# Patient Record
Sex: MALE | Race: WHITE | Employment: FULL TIME | URBAN - METROPOLITAN AREA
[De-identification: names, ages, dates, MRNs, and addresses within clinical notes are randomized per-mention and may not be internally consistent; named-entity substitution may affect disease eponyms.]

---

## 2017-06-07 ENCOUNTER — LAB ENCOUNTER (OUTPATIENT)
Dept: LAB | Age: 37
End: 2017-06-07
Attending: FAMILY MEDICINE
Payer: COMMERCIAL

## 2017-06-07 ENCOUNTER — OFFICE VISIT (OUTPATIENT)
Dept: FAMILY MEDICINE CLINIC | Facility: CLINIC | Age: 37
End: 2017-06-07

## 2017-06-07 ENCOUNTER — HOSPITAL ENCOUNTER (OUTPATIENT)
Dept: GENERAL RADIOLOGY | Facility: HOSPITAL | Age: 37
Discharge: HOME | End: 2017-06-07
Attending: FAMILY MEDICINE
Payer: COMMERCIAL

## 2017-06-07 VITALS
DIASTOLIC BLOOD PRESSURE: 69 MMHG | HEART RATE: 66 BPM | WEIGHT: 203 LBS | SYSTOLIC BLOOD PRESSURE: 106 MMHG | TEMPERATURE: 98 F | BODY MASS INDEX: 30.07 KG/M2 | HEIGHT: 69 IN

## 2017-06-07 DIAGNOSIS — Z83.3 FH: DIABETES MELLITUS: ICD-10-CM

## 2017-06-07 DIAGNOSIS — Z00.00 WELL ADULT EXAM: ICD-10-CM

## 2017-06-07 DIAGNOSIS — Z00.00 WELL ADULT EXAM: Primary | ICD-10-CM

## 2017-06-07 DIAGNOSIS — R22.32 MASS OF FINGER OF LEFT HAND: ICD-10-CM

## 2017-06-07 DIAGNOSIS — E66.3 OVERWEIGHT (BMI 25.0-29.9): ICD-10-CM

## 2017-06-07 PROCEDURE — 36415 COLL VENOUS BLD VENIPUNCTURE: CPT

## 2017-06-07 PROCEDURE — 99395 PREV VISIT EST AGE 18-39: CPT | Performed by: FAMILY MEDICINE

## 2017-06-07 PROCEDURE — 83036 HEMOGLOBIN GLYCOSYLATED A1C: CPT

## 2017-06-07 PROCEDURE — 84443 ASSAY THYROID STIM HORMONE: CPT

## 2017-06-07 PROCEDURE — 85025 COMPLETE CBC W/AUTO DIFF WBC: CPT

## 2017-06-07 PROCEDURE — 80048 BASIC METABOLIC PNL TOTAL CA: CPT

## 2017-06-07 PROCEDURE — 80061 LIPID PANEL: CPT

## 2017-06-07 PROCEDURE — 84460 ALANINE AMINO (ALT) (SGPT): CPT

## 2017-06-07 PROCEDURE — 73120 X-RAY EXAM OF HAND: CPT | Performed by: FAMILY MEDICINE

## 2017-06-07 PROCEDURE — 84450 TRANSFERASE (AST) (SGOT): CPT

## 2017-06-07 NOTE — PROGRESS NOTES
Hansel Miller is a 43year old male who presents for a complete physical exam.   HPI:   Pt complains of Patient presents with:   Annual: Annual physical  Finger Pain: Pt c/o LT middle digit pain      Wt Readings from Last 6 Encounters:  06/07/17 : 203 lb atraumatic, normocephalic,ears and throat are clear  EYES:PERRLA, EOMI, conjunctiva are clear, extraocular movements intact  NECK: supple,no adenopathy,no bruits  CHEST: no chest tenderness  BREAST: no dominant or suspicious mass  LUNGS: clear to auscultat

## 2017-06-12 ENCOUNTER — TELEPHONE (OUTPATIENT)
Dept: FAMILY MEDICINE CLINIC | Facility: CLINIC | Age: 37
End: 2017-06-12

## 2017-06-12 NOTE — TELEPHONE ENCOUNTER
Patient called in and would like to have his results relayed to him over the phone. Please call him to discuss results. Please advise.

## 2017-06-19 ENCOUNTER — OFFICE VISIT (OUTPATIENT)
Dept: SURGERY | Facility: CLINIC | Age: 37
End: 2017-06-19

## 2017-06-19 DIAGNOSIS — M12.242 PIGMENTED VILLONODULAR SYNOVITIS OF LEFT HAND: Primary | ICD-10-CM

## 2017-06-19 PROCEDURE — 99212 OFFICE O/P EST SF 10 MIN: CPT | Performed by: PLASTIC SURGERY

## 2017-06-19 PROCEDURE — 99243 OFF/OP CNSLTJ NEW/EST LOW 30: CPT | Performed by: PLASTIC SURGERY

## 2017-06-19 RX ORDER — HYDROCODONE BITARTRATE AND ACETAMINOPHEN 7.5; 325 MG/1; MG/1
TABLET ORAL
Qty: 25 TABLET | Refills: 0 | Status: SHIPPED | OUTPATIENT
Start: 2017-06-19

## 2017-06-19 NOTE — PROGRESS NOTES
Pt request for surgery signed by pt and witnessed and signed by RN. Prescription for Norco and narcotic hand-out instruction sheet given to and reviewed w/patient.   Pre-Surgical Instruction Handout, Hand Elevation Handout, Dressing Protector Handout, and

## 2017-06-19 NOTE — H&P
Allison Andre is a 42year old male that presents with Patient presents with:  Ganglion: LMF  .     REFERRED BY:  Tammy Lopez    Pacemaker: No  Latex Allergy: no  Coumadin: No  Plavix: No  Other anticoagulants: No  Cardiac stents: No    HAND DOMINANCE: History   Problem Relation Age of Onset   • Diabetes Father    • Hypertension Father    • Lipids Father    • Diabetes Mother    • Hypertension Mother    • Lipids Mother    • Cancer Maternal Grandmother      breast cancer   • Cancer Maternal Grandfather 79 pain, stiffness, weakness, and loss of function. Even with satisfactory healing, the hand/digit may not regain normal ROM or normal function.         6/19/2017  Phillip Rodriguez MD

## 2017-06-21 NOTE — TELEPHONE ENCOUNTER
Chart reviewed. Please note, Pt viewed test results on 6/13/17. LMTCB if pt has any questions.     Notes Recorded by Bonny Johnson MD on 6/8/2017 at 4:48 PM  Labs all good including no diabetes but cholesterol a little elevated  Low fat low cholesterol diet

## 2017-09-15 ENCOUNTER — TELEPHONE (OUTPATIENT)
Dept: SURGERY | Facility: CLINIC | Age: 37
End: 2017-09-15

## 2017-09-15 NOTE — TELEPHONE ENCOUNTER
Called and spoke to pt regarding scheduling sx (EXC MASS LMF)  Per pt he stts \"I am not ready to schedule at this time and I'm still thinking it over\". Thank You.

## 2018-03-09 ENCOUNTER — TELEPHONE (OUTPATIENT)
Dept: SURGERY | Facility: CLINIC | Age: 38
End: 2018-03-09

## 2018-03-09 NOTE — TELEPHONE ENCOUNTER
Pt called per Dr. 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 LVM for pt to please call our office. Dr. Robert Nunez notified.

## 2023-10-23 ENCOUNTER — OFFICE VISIT (OUTPATIENT)
Dept: OTOLARYNGOLOGY | Facility: CLINIC | Age: 43
End: 2023-10-23

## 2023-10-23 VITALS — WEIGHT: 205 LBS | BODY MASS INDEX: 29.35 KG/M2 | HEIGHT: 70 IN

## 2023-10-23 DIAGNOSIS — M26.609 TMJ (TEMPOROMANDIBULAR JOINT DISORDER): Primary | ICD-10-CM

## 2023-10-23 DIAGNOSIS — H92.01 RIGHT EAR PAIN: ICD-10-CM

## 2023-10-23 RX ORDER — MELOXICAM 15 MG/1
15 TABLET ORAL NIGHTLY
Qty: 30 TABLET | Refills: 0 | Status: SHIPPED | OUTPATIENT
Start: 2023-10-23 | End: 2023-11-22

## 2023-10-23 RX ORDER — PREDNISONE 10 MG/1
TABLET ORAL
COMMUNITY
Start: 2023-10-11

## 2023-10-23 RX ORDER — METHYLPREDNISOLONE 4 MG/1
4 TABLET ORAL AS DIRECTED
COMMUNITY
Start: 2023-09-28

## 2023-10-23 RX ORDER — LORATADINE 10 MG/1
10 TABLET ORAL DAILY
COMMUNITY

## 2023-10-23 RX ORDER — CYCLOBENZAPRINE HCL 5 MG
5 TABLET ORAL NIGHTLY
Qty: 30 TABLET | Refills: 0 | Status: SHIPPED | OUTPATIENT
Start: 2023-10-23 | End: 2023-11-22

## 2023-10-23 RX ORDER — FLUTICASONE PROPIONATE 50 MCG
2 SPRAY, SUSPENSION (ML) NASAL DAILY
COMMUNITY
Start: 2023-09-28

## 2023-11-10 ENCOUNTER — TELEPHONE (OUTPATIENT)
Dept: OTOLARYNGOLOGY | Facility: CLINIC | Age: 43
End: 2023-11-10

## 2023-11-10 DIAGNOSIS — M26.609 TMJ (TEMPOROMANDIBULAR JOINT DISORDER): Primary | ICD-10-CM

## 2023-11-27 ENCOUNTER — OFFICE VISIT (OUTPATIENT)
Dept: OTOLARYNGOLOGY | Facility: CLINIC | Age: 43
End: 2023-11-27
Payer: COMMERCIAL

## 2023-11-27 DIAGNOSIS — J34.2 NASAL SEPTAL DEVIATION: ICD-10-CM

## 2023-11-27 DIAGNOSIS — J34.89 NASAL OBSTRUCTION: ICD-10-CM

## 2023-11-27 DIAGNOSIS — M26.609 TMJ (TEMPOROMANDIBULAR JOINT DISORDER): Primary | ICD-10-CM

## 2023-11-27 PROCEDURE — 99213 OFFICE O/P EST LOW 20 MIN: CPT | Performed by: STUDENT IN AN ORGANIZED HEALTH CARE EDUCATION/TRAINING PROGRAM

## 2023-11-27 PROCEDURE — 31231 NASAL ENDOSCOPY DX: CPT | Performed by: STUDENT IN AN ORGANIZED HEALTH CARE EDUCATION/TRAINING PROGRAM

## 2023-11-28 NOTE — PROGRESS NOTES
Irina Bearden is a 37year old male. Chief Complaint   Patient presents with    Follow - Up     Patient is here due to TMJ follow up. Reports some improvement in symptoms. Sinus Problem     Reports sinus pressure x 2 weeks. ASSESSMENT AND PLAN:   1. TMJ (temporomandibular joint disorder)  68-year-old presents with right-sided facial symptoms. He notes some possible nerve related symptoms of his right temple area. Of note he is status post a basal cell carcinoma Mohs procedure of his right cheek. He saw the dermatologist on November 15 with no evidence of disease. I saw him previously if diagnosed with TMJ he is in physical therapy. Exam remains stable. Septal deviation to the right present. Discussed these findings that his nasal obstruction and congestion may be related to the septal deviation. Discussed using Flonase and Claritin-D. Possibly his nasal obstruction is leading to mouth breathing and further stress on the temporomandibular joints. Could consider correction of the septum in the future with septoplasty we will first trial medical management. 2. Nasal septal deviation      3. Nasal obstruction        The patient indicates understanding of these issues and agrees to the plan. EXAM:   There were no vitals taken for this visit.     Pertinent exam findings may also be noted above in assessment and plan     System Details   Skin Inspection - Normal.   Constitutional Overall appearance - Normal.   Head/Face Symmetric, TMJ tenderness not present    Eyes EOMI, PERRL   Right ear:  Canal clear, TM intact, no TAMMY   Left ear:  Canal clear, TM intact, no TAMMY   Nose: Septum midline, inferior turbinates not enlarged, nasal valves without collapse    Oral cavity/Oropharynx: No lesions or masses on inspection or palpation, tonsils symmetric    Neck: Soft without LAD, thyroid not enlarged  Voice clear/ no stridor   Other:      Scopes and Procedures:             Current Outpatient Medications   Medication Sig Dispense Refill    fluticasone propionate 50 MCG/ACT Nasal Suspension 2 sprays by Nasal route daily. (Patient not taking: Reported on 11/27/2023)      methylPREDNISolone 4 MG Oral Tablet Therapy Pack Take 1 tablet (4 mg total) by mouth As Directed. FOLLOW DIRECTIONS ON PACKAGING (Patient not taking: Reported on 10/23/2023)      predniSONE 10 MG Oral Tab TAKE 3 TABLETS BY MOUTH DAILY FOR 2 DAYS THEN TAKE 2 TABLETS BY MOUTH DAILY FOR 2 DAYS THEN TAKE 1 TABLET BY MOUTH DAILY FOR 3 DAYS (Patient not taking: Reported on 10/23/2023)      loratadine 10 MG Oral Tab Take 1 tablet (10 mg total) by mouth daily. (Patient not taking: Reported on 11/27/2023)      hydrocodone-acetaminophen 7.5-325 MG Oral Tab Take 0.5-1 tablets by mouth every 4 to 6 hours as needed for Pain. (Patient not taking: Reported on 10/23/2023) 25 tablet 0      History reviewed. No pertinent past medical history.    Social History:  Social History     Socioeconomic History    Marital status:    Tobacco Use    Smoking status: Never   Substance and Sexual Activity    Alcohol use: Yes     Comment: Socially    Drug use: No          Ilan Francois MD  11/27/2023  6:19 PM

## (undated) NOTE — Clinical Note
2017      Patient: Ursula Peterson  : 6/15/1980 Visit date: 2017    Dear Balaji Devries,      I examined your patient in consultation today. 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

## (undated) NOTE — MR AVS SNAPSHOT
[address], [address] [place] [id number] [phone]               Thank you for choosing us for your health care visit with Bhakti Garcia MD.  We are glad to serve you and happy to provide you with this summary Visit [facility] online at  [email or web address]

## (undated) NOTE — MR AVS SNAPSHOT
[facility] SPECIALTY [facility] [place] [address]  [id number] [phone]               Thank you for choosing us for your health care visit with Gissel Lopez MD.  We are glad to serve you and happy to provide you with this summary of yo between 7:30am to 6pm and on Saturday between 8am and 1pm. Evening and weekend appointments for your exam are available. Walk-in patients are welcome for most exams.      [facility] and [id number] [facility] requirements for authorization, please wait 5-7 days and then contact your physician's   office. At that time, you will be provided with any authorization numbers or be assured that none are required. You can then schedule your appointment.  Failure to obta active are less likely to develop some chronic diseases than adults who are inactive.      HOW TO GET STARTED: HOW TO STAY MOTIVATED:   Start activities slowly and build up over time Do what you like   Get your heart pumping – brisk walking, biking, swimmin

## (undated) NOTE — Clinical Note
June 23, 2017    Khanh G[address]mez  [address]      Dear Soheila Teran: The following are the results of your recent tests. Please review the list of test results.   Your result is the value on the left; we have also supplied the r Low fat low cholesterol diet and add fish oil/ omega supplements daily.   Recheck cholesterol in 6 months      Please call if you have further questions,    Dr. Kitchen Basye

## (undated) NOTE — Clinical Note
June 23, 2017    [address]      Dear Lilly Scheuermann: The following are the results of your recent tests. Please review the list of test results.   Your result is the value on the left; we have also supplied the r Low fat low cholesterol diet and add fish oil/ omega supplements daily. Recheck cholesterol in 6 months.     Please call if you have further questions,    Dr. Chyna Marquez